# Patient Record
Sex: FEMALE | Race: WHITE
[De-identification: names, ages, dates, MRNs, and addresses within clinical notes are randomized per-mention and may not be internally consistent; named-entity substitution may affect disease eponyms.]

---

## 2016-10-27 VITALS — DIASTOLIC BLOOD PRESSURE: 70 MMHG | SYSTOLIC BLOOD PRESSURE: 116 MMHG

## 2017-01-20 ENCOUNTER — HOSPITAL ENCOUNTER (OUTPATIENT)
Dept: HOSPITAL 61 - CT | Age: 57
Discharge: HOME | End: 2017-01-20
Attending: SURGERY
Payer: COMMERCIAL

## 2017-01-20 DIAGNOSIS — R91.1: ICD-10-CM

## 2017-01-20 DIAGNOSIS — R10.84: Primary | ICD-10-CM

## 2017-01-20 PROCEDURE — 74174 CTA ABD&PLVS W/CONTRAST: CPT

## 2017-01-20 NOTE — RAD
Indication abdominal pain. Consider SMA syndrome.



CTA targeted to the abdominal aorta and its major branches was performed.

Images were reformatted in the coronal and sagittal planes. Volume rendered

images were also generated and reviewed.



An acute finding at either lung base is not seen. There is some scarring at

the left lung base. There is a small, approximately 2 mm, pulmonary nodule

peripherally in the left lower lobe, image 14 series 3. Follow-up imaging,

along the lines of the Fleischner criteria, should be considered. There is

some fatty infiltration of the liver. A focal mass lesion in the liver is not

seen. The spleen appears unremarkable. The pancreas appears normal. No adrenal

or renal anomalies are seen.



There are air fluid collections in the right lower quadrant that are not

clearly in bowel and are suggestive of small abscesses. One fluid collection,

immediately behind the rectus muscle, is almost certainly not in a bowel loop

and is compatible with a small abscess. (Findings secondary to ruptured

appendix accounting for the appearance is not excluded). There is,

additionally, significant dilatation and stool in distal loops of small bowel

(small bowel feces sign) compatible with incidental inspissated stool in the

small bowel. There is, additionally, some suggested thickening of small bowel

loops in the right lower quadrant suggesting superimposed enteritis.



Significant dilatation of the duodenal sweep, prior to traversing the aortic

SMA junction as might be expected with an SMA syndrome is not seen. On the

lateral images the angle between the abdominal aorta and the origination of

the superior mesenteric artery off the aorta is not particularly narrowed. The

distance between the aorta and SMA, at the level of the duodenum is borderline

normal. The overall appearance is not suggestive of SMA syndrome. There is no

significant narrowing at the origin of the celiac or SMA. Intestinal ischemia

is not suggested on this exam.



Dr. Glez was notified of the findings at 10:45 AM,



IMPRESSION: Probable abscesses in the right lower quadrant. The sequela of

ruptured appendicitis could account for the appearance. There is inspissated

bowel within distal small bowel loops which are moderately dilated. A

component of at least partial mechanical small bowel obstruction is thought

likely. Finally superimposed associated enteritis in distal small bowel loops

is suspect.. A follow-up examination, after opacification of all bowel loops

with GI contrast would be useful to confirm this initial impression.

                         Definite findings suggesting SMA syndrome are not

seen

                         Small pulmonary nodule in the left lower lobe.

Follow-up imaging, along the lines of the Fleischner criteria, should be

considered



















PQRS Compliance Statement:



One or more of the following individualized dose reduction techniques were

utilized for this examination:

1. Automated exposure control

2. Adjustment of the mA and/or kV according to patient size

3. Use of iterative reconstruction technique

## 2017-02-08 VITALS — SYSTOLIC BLOOD PRESSURE: 142 MMHG | DIASTOLIC BLOOD PRESSURE: 84 MMHG

## 2018-03-25 ENCOUNTER — HOSPITAL ENCOUNTER (INPATIENT)
Dept: HOSPITAL 61 - ER | Age: 58
LOS: 4 days | Discharge: HOME HEALTH | DRG: 481 | End: 2018-03-29
Attending: INTERNAL MEDICINE | Admitting: INTERNAL MEDICINE
Payer: COMMERCIAL

## 2018-03-25 DIAGNOSIS — D75.89: ICD-10-CM

## 2018-03-25 DIAGNOSIS — Z82.49: ICD-10-CM

## 2018-03-25 DIAGNOSIS — Z88.8: ICD-10-CM

## 2018-03-25 DIAGNOSIS — D53.9: ICD-10-CM

## 2018-03-25 DIAGNOSIS — I10: ICD-10-CM
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
## 2018-03-25 DIAGNOSIS — F17.210: ICD-10-CM

## 2018-03-25 DIAGNOSIS — S72.21XA: Primary | ICD-10-CM
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
## 2018-03-25 DIAGNOSIS — F10.10: ICD-10-CM
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
## 2018-03-25 LAB
ADD MAN DIFF?: NO
ALBUMIN SERPL-MCNC: 3.3 G/DL (ref 3.4–5)
ALP SERPL-CCNC: 145 U/L (ref 46–116)
ALT (SGPT): 30 U/L (ref 14–59)
AMPHETAMINE/METHAMPHETAMINE: (no result)
ANION GAP SERPL CALC-SCNC: 17 MMOL/L (ref 6–14)
AST SERPL-CCNC: 36 U/L (ref 15–37)
BACTERIA,URINE: (no result) /HPF
BARBITURATES: (no result)
BASO #: 0 X10^3/UL (ref 0–0.2)
BASO %: 0 % (ref 0–3)
BENZODIAZEPINES: (no result)
BILIRUB DIRECT SERPL-MCNC: 0.1 MG/DL (ref 0–0.2)
BILIRUBIN,URINE: NEGATIVE
BLOOD UREA NITROGEN: 8 MG/DL (ref 7–20)
CALCIUM: 8.5 MG/DL (ref 8.5–10.1)
CANNABINOIDS: (no result)
CHLORIDE: 99 MMOL/L (ref 98–107)
CK SERPL-CCNC: 687 U/L (ref 26–192)
CKMB INDEX: 0.3 % (ref 0–4)
CKMB MASS: 1.8 NG/ML (ref 0–3.6)
CLARITY,URINE: CLEAR
CO2 SERPL-SCNC: 21 MMOL/L (ref 21–32)
COCAINE: (no result)
COLOR,URINE: YELLOW
CREAT SERPL-MCNC: 0.6 MG/DL (ref 0.6–1)
EOS #: 0 X10^3/UL (ref 0–0.7)
EOS %: 0 % (ref 0–3)
ETHANOL, URINE: (no result)
ETHANOL: 133 MG/DL (ref 0–10)
GFR SERPLBLD BASED ON 1.73 SQ M-ARVRAT: 102.3 ML/MIN
GLUCOSE SERPL-MCNC: 105 MG/DL (ref 70–99)
GLUCOSE,URINE: NEGATIVE MG/DL
HCG SERPL-ACNC: 8.2 X10^3/UL (ref 4–11)
HEMATOCRIT: 33 % (ref 36–47)
HEMOGLOBIN: 11.4 G/DL (ref 12–15.5)
LYMPH #: 0.9 X10^3/UL (ref 1–4.8)
LYMPH %: 11 % (ref 24–48)
MAGNESIUM: 1.7 MG/DL (ref 1.8–2.4)
MEAN CORPUSCULAR HEMOGLOBIN: 37 PG (ref 25–35)
MEAN CORPUSCULAR HGB CONC: 35 G/DL (ref 31–37)
MEAN CORPUSCULAR VOLUME: 107 FL (ref 79–100)
METHADONE: (no result)
MONO #: 0.6 X10^3/UL (ref 0–1.1)
MONO %: 8 % (ref 0–9)
NEUT #: 6.6 X10^3UL (ref 1.8–7.7)
NEUT %: 81 % (ref 31–73)
NITRITE,URINE: NEGATIVE
NT-PRO BNP: 349 PG/ML (ref 0–124)
OPIATES: (no result)
PH,URINE: 5.5
PHENCYCLIDINE: (no result)
PLATELET COUNT: 173 X10^3/UL (ref 140–400)
POTASSIUM SERPL-SCNC: 4.3 MMOL/L (ref 3.5–5.1)
PROTEIN,URINE: NEGATIVE MG/DL
RBC,URINE: (no result) /HPF (ref 0–2)
RED BLOOD COUNT: 3.08 X10^6/UL (ref 3.5–5.4)
RED CELL DISTRIBUTION WIDTH: 14.9 % (ref 11.5–14.5)
SODIUM: 137 MMOL/L (ref 136–145)
SPECIFIC GRAVITY,URINE: 1.01
SQUAMOUS EPITHELIAL CELL,UR: (no result) /LPF
THYROID STIM HORMONE (TSH): 1.66 UIU/ML (ref 0.36–3.74)
TOTAL BILIRUBIN: 0.4 MG/DL (ref 0.2–1)
TOTAL PROTEIN: 6.9 G/DL (ref 6.4–8.2)
TROPONINI: < 0.017 NG/ML (ref 0–0.06)
UROBILINOGEN,URINE: 0.2 MG/DL
WBC,URINE: (no result) /HPF (ref 0–4)

## 2018-03-25 PROCEDURE — 36415 COLL VENOUS BLD VENIPUNCTURE: CPT

## 2018-03-25 PROCEDURE — 97535 SELF CARE MNGMENT TRAINING: CPT

## 2018-03-25 PROCEDURE — 84443 ASSAY THYROID STIM HORMONE: CPT

## 2018-03-25 PROCEDURE — 73552 X-RAY EXAM OF FEMUR 2/>: CPT

## 2018-03-25 PROCEDURE — 86850 RBC ANTIBODY SCREEN: CPT

## 2018-03-25 PROCEDURE — 84484 ASSAY OF TROPONIN QUANT: CPT

## 2018-03-25 PROCEDURE — 86920 COMPATIBILITY TEST SPIN: CPT

## 2018-03-25 PROCEDURE — 30233N1 TRANSFUSION OF NONAUTOLOGOUS RED BLOOD CELLS INTO PERIPHERAL VEIN, PERCUTANEOUS APPROACH: ICD-10-PCS

## 2018-03-25 PROCEDURE — 99285 EMERGENCY DEPT VISIT HI MDM: CPT

## 2018-03-25 PROCEDURE — 86900 BLOOD TYPING SEROLOGIC ABO: CPT

## 2018-03-25 PROCEDURE — 80053 COMPREHEN METABOLIC PANEL: CPT

## 2018-03-25 PROCEDURE — 71045 X-RAY EXAM CHEST 1 VIEW: CPT

## 2018-03-25 PROCEDURE — 73502 X-RAY EXAM HIP UNI 2-3 VIEWS: CPT

## 2018-03-25 PROCEDURE — 82553 CREATINE MB FRACTION: CPT

## 2018-03-25 PROCEDURE — 93005 ELECTROCARDIOGRAM TRACING: CPT

## 2018-03-25 PROCEDURE — 0QS606Z REPOSITION RIGHT UPPER FEMUR WITH INTRAMEDULLARY INTERNAL FIXATION DEVICE, OPEN APPROACH: ICD-10-PCS

## 2018-03-25 PROCEDURE — 97166 OT EVAL MOD COMPLEX 45 MIN: CPT

## 2018-03-25 PROCEDURE — 97116 GAIT TRAINING THERAPY: CPT

## 2018-03-25 PROCEDURE — 29505 APPLICATION LONG LEG SPLINT: CPT

## 2018-03-25 PROCEDURE — 83880 ASSAY OF NATRIURETIC PEPTIDE: CPT

## 2018-03-25 PROCEDURE — 80307 DRUG TEST PRSMV CHEM ANLYZR: CPT

## 2018-03-25 PROCEDURE — 80076 HEPATIC FUNCTION PANEL: CPT

## 2018-03-25 PROCEDURE — 81001 URINALYSIS AUTO W/SCOPE: CPT

## 2018-03-25 PROCEDURE — 51702 INSERT TEMP BLADDER CATH: CPT

## 2018-03-25 PROCEDURE — 83735 ASSAY OF MAGNESIUM: CPT

## 2018-03-25 PROCEDURE — 97530 THERAPEUTIC ACTIVITIES: CPT

## 2018-03-25 PROCEDURE — 85014 HEMATOCRIT: CPT

## 2018-03-25 PROCEDURE — 86901 BLOOD TYPING SEROLOGIC RH(D): CPT

## 2018-03-25 PROCEDURE — 85018 HEMOGLOBIN: CPT

## 2018-03-25 PROCEDURE — 96374 THER/PROPH/DIAG INJ IV PUSH: CPT

## 2018-03-25 PROCEDURE — 97161 PT EVAL LOW COMPLEX 20 MIN: CPT

## 2018-03-25 PROCEDURE — 85025 COMPLETE CBC W/AUTO DIFF WBC: CPT

## 2018-03-25 PROCEDURE — 76000 FLUOROSCOPY <1 HR PHYS/QHP: CPT

## 2018-03-25 PROCEDURE — 90686 IIV4 VACC NO PRSV 0.5 ML IM: CPT

## 2018-03-25 PROCEDURE — 97110 THERAPEUTIC EXERCISES: CPT

## 2018-03-25 PROCEDURE — 80048 BASIC METABOLIC PNL TOTAL CA: CPT

## 2018-03-25 PROCEDURE — 82306 VITAMIN D 25 HYDROXY: CPT

## 2018-03-25 RX ADMIN — FENTANYL CITRATE 1 MCG: 50 INJECTION INTRAMUSCULAR; INTRAVENOUS at 09:37

## 2018-03-25 RX ADMIN — SODIUM CHLORIDE, SODIUM LACTATE, POTASSIUM CHLORIDE, AND CALCIUM CHLORIDE 1 MLS/HR: .6; .31; .03; .02 INJECTION, SOLUTION INTRAVENOUS at 15:28

## 2018-03-25 RX ADMIN — FENTANYL CITRATE 1 MCG: 50 INJECTION INTRAMUSCULAR; INTRAVENOUS at 15:21

## 2018-03-25 RX ADMIN — DEXTROSE AND SODIUM CHLORIDE 1 MLS/HR: 5; .45 INJECTION, SOLUTION INTRAVENOUS at 11:26

## 2018-03-25 RX ADMIN — SENNOSIDES AND DOCUSATE SODIUM 1 TAB: 8.6; 5 TABLET ORAL at 18:47

## 2018-03-25 RX ADMIN — CEFAZOLIN 1 GM: 330 INJECTION, POWDER, FOR SOLUTION INTRAMUSCULAR; INTRAVENOUS at 18:52

## 2018-03-25 RX ADMIN — FOLIC ACID 1 MLS/HR: 5 INJECTION, SOLUTION INTRAMUSCULAR; INTRAVENOUS; SUBCUTANEOUS at 18:50

## 2018-03-25 RX ADMIN — CEFAZOLIN 1 MLS/HR: 1 INJECTION, POWDER, FOR SOLUTION INTRAMUSCULAR; INTRAVENOUS at 15:30

## 2018-03-25 RX ADMIN — SODIUM CHLORIDE, SODIUM LACTATE, POTASSIUM CHLORIDE, AND CALCIUM CHLORIDE 1 MLS/HR: .6; .31; .03; .02 INJECTION, SOLUTION INTRAVENOUS at 13:45

## 2018-03-25 RX ADMIN — MAGNESIUM SULFATE IN WATER 1 MLS/HR: 40 INJECTION, SOLUTION INTRAVENOUS at 18:51

## 2018-03-25 RX ADMIN — FOLIC ACID-PYRIDOXINE-CYANOCOBALAMIN TAB 2.5-25-2 MG 1 TAB: 2.5-25-2 TAB at 18:47

## 2018-03-25 RX ADMIN — APIXABAN 1 MG: 2.5 TABLET, FILM COATED ORAL at 20:25

## 2018-03-26 LAB
ADD MAN DIFF?: NO
ALBUMIN SERPL-MCNC: 2.5 G/DL (ref 3.4–5)
ALBUMIN/GLOB SERPL: 0.8 {RATIO} (ref 1–1.7)
ALP SERPL-CCNC: 104 U/L (ref 46–116)
ALT (SGPT): 20 U/L (ref 14–59)
ANION GAP SERPL CALC-SCNC: 9 MMOL/L (ref 6–14)
AST SERPL-CCNC: 23 U/L (ref 15–37)
BASO #: 0 X10^3/UL (ref 0–0.2)
BASO %: 0 % (ref 0–3)
BLOOD UREA NITROGEN: 9 MG/DL (ref 7–20)
BUN/CREAT SERPL: 15 (ref 6–20)
CALCIUM: 8.2 MG/DL (ref 8.5–10.1)
CHLORIDE: 102 MMOL/L (ref 98–107)
CO2 SERPL-SCNC: 27 MMOL/L (ref 21–32)
CREAT SERPL-MCNC: 0.6 MG/DL (ref 0.6–1)
EOS #: 0 X10^3/UL (ref 0–0.7)
EOS %: 0 % (ref 0–3)
GFR SERPLBLD BASED ON 1.73 SQ M-ARVRAT: 102.3 ML/MIN
GLOBULIN SER-MCNC: 3.2 G/DL (ref 2.2–3.8)
GLUCOSE SERPL-MCNC: 112 MG/DL (ref 70–99)
HCG SERPL-ACNC: 5.2 X10^3/UL (ref 4–11)
HEMATOCRIT: 21.9 % (ref 36–47)
HEMOGLOBIN: 7.7 G/DL (ref 12–15.5)
LYMPH #: 1.6 X10^3/UL (ref 1–4.8)
LYMPH %: 31 % (ref 24–48)
MEAN CORPUSCULAR HEMOGLOBIN: 38 PG (ref 25–35)
MEAN CORPUSCULAR HGB CONC: 35 G/DL (ref 31–37)
MEAN CORPUSCULAR VOLUME: 107 FL (ref 79–100)
MONO #: 0.7 X10^3/UL (ref 0–1.1)
MONO %: 14 % (ref 0–9)
NEUT #: 2.9 X10^3UL (ref 1.8–7.7)
NEUT %: 55 % (ref 31–73)
PLATELET COUNT: 116 X10^3/UL (ref 140–400)
POTASSIUM SERPL-SCNC: 4.5 MMOL/L (ref 3.5–5.1)
RED BLOOD COUNT: 2.04 X10^6/UL (ref 3.5–5.4)
RED CELL DISTRIBUTION WIDTH: 14.7 % (ref 11.5–14.5)
SODIUM: 138 MMOL/L (ref 136–145)
TOTAL BILIRUBIN: 0.4 MG/DL (ref 0.2–1)
TOTAL PROTEIN: 5.7 G/DL (ref 6.4–8.2)

## 2018-03-26 RX ADMIN — SODIUM CHLORIDE, SODIUM LACTATE, POTASSIUM CHLORIDE, AND CALCIUM CHLORIDE 1 MLS/HR: .6; .31; .03; .02 INJECTION, SOLUTION INTRAVENOUS at 02:20

## 2018-03-26 RX ADMIN — CEFAZOLIN 1 GM: 330 INJECTION, POWDER, FOR SOLUTION INTRAMUSCULAR; INTRAVENOUS at 01:00

## 2018-03-26 RX ADMIN — MULTIPLE VITAMINS W/ MINERALS TAB 1 TAB: TAB at 08:49

## 2018-03-26 RX ADMIN — INFLUENZA VIRUS VACCINE 1 ML: 15; 15; 15; 15 SUSPENSION INTRAMUSCULAR at 12:13

## 2018-03-26 RX ADMIN — LISINOPRIL 1 MG: 5 TABLET ORAL at 08:48

## 2018-03-26 RX ADMIN — Medication 1 EACH: at 11:30

## 2018-03-26 RX ADMIN — APIXABAN 1 MG: 2.5 TABLET, FILM COATED ORAL at 19:51

## 2018-03-26 RX ADMIN — APIXABAN 1 MG: 2.5 TABLET, FILM COATED ORAL at 09:00

## 2018-03-26 RX ADMIN — ASPIRIN 1 MG: 81 TABLET, COATED ORAL at 08:49

## 2018-03-26 RX ADMIN — CEFAZOLIN 1 GM: 330 INJECTION, POWDER, FOR SOLUTION INTRAMUSCULAR; INTRAVENOUS at 02:39

## 2018-03-26 RX ADMIN — FOLIC ACID-PYRIDOXINE-CYANOCOBALAMIN TAB 2.5-25-2 MG 1 TAB: 2.5-25-2 TAB at 08:48

## 2018-03-26 RX ADMIN — SENNOSIDES AND DOCUSATE SODIUM 1 TAB: 8.6; 5 TABLET ORAL at 08:49

## 2018-03-26 RX ADMIN — CEFAZOLIN 1 GM: 330 INJECTION, POWDER, FOR SOLUTION INTRAMUSCULAR; INTRAVENOUS at 08:49

## 2018-03-26 RX ADMIN — CHOLECALCIFEROL CAP 125 MCG (5000 UNIT) 1 UNIT: 125 CAP at 08:48

## 2018-03-27 LAB
ADD MAN DIFF?: NO
ALBUMIN SERPL-MCNC: 2.3 G/DL (ref 3.4–5)
ALBUMIN/GLOB SERPL: 0.7 {RATIO} (ref 1–1.7)
ALP SERPL-CCNC: 104 U/L (ref 46–116)
ALT (SGPT): 17 U/L (ref 14–59)
ANION GAP SERPL CALC-SCNC: 5 MMOL/L (ref 6–14)
AST SERPL-CCNC: 25 U/L (ref 15–37)
BASO #: 0 X10^3/UL (ref 0–0.2)
BASO %: 0 % (ref 0–3)
BLOOD UREA NITROGEN: 9 MG/DL (ref 7–20)
BUN/CREAT SERPL: 15 (ref 6–20)
CALCIUM: 8.3 MG/DL (ref 8.5–10.1)
CHLORIDE: 104 MMOL/L (ref 98–107)
CO2 SERPL-SCNC: 31 MMOL/L (ref 21–32)
CREAT SERPL-MCNC: 0.6 MG/DL (ref 0.6–1)
EOS #: 0 X10^3/UL (ref 0–0.7)
EOS %: 0 % (ref 0–3)
GFR SERPLBLD BASED ON 1.73 SQ M-ARVRAT: 102.3 ML/MIN
GLOBULIN SER-MCNC: 3.2 G/DL (ref 2.2–3.8)
GLUCOSE SERPL-MCNC: 94 MG/DL (ref 70–99)
HCG SERPL-ACNC: 4.8 X10^3/UL (ref 4–11)
HEMATOCRIT: 19.8 % (ref 36–47)
HEMATOCRIT: 29.4 % (ref 36–47)
HEMOGLOBIN: 10 G/DL (ref 12–15.5)
HEMOGLOBIN: 6.9 G/DL (ref 12–15.5)
IMMEDIATE SPIN CROSSMATCH: 1 2
LYMPH #: 2 X10^3/UL (ref 1–4.8)
LYMPH %: 42 % (ref 24–48)
MEAN CORPUSCULAR HEMOGLOBIN: 38 PG (ref 25–35)
MEAN CORPUSCULAR HGB CONC: 34 G/DL (ref 31–37)
MEAN CORPUSCULAR HGB CONC: 35 G/DL (ref 31–37)
MEAN CORPUSCULAR VOLUME: 109 FL (ref 79–100)
MONO #: 0.5 X10^3/UL (ref 0–1.1)
MONO %: 10 % (ref 0–9)
NEUT #: 2.3 X10^3UL (ref 1.8–7.7)
NEUT %: 48 % (ref 31–73)
PLATELET COUNT: 124 X10^3/UL (ref 140–400)
POTASSIUM SERPL-SCNC: 3.9 MMOL/L (ref 3.5–5.1)
RED BLOOD COUNT: 1.82 X10^6/UL (ref 3.5–5.4)
RED CELL DISTRIBUTION WIDTH: 14.7 % (ref 11.5–14.5)
SODIUM: 140 MMOL/L (ref 136–145)
TOTAL BILIRUBIN: 0.4 MG/DL (ref 0.2–1)
TOTAL PROTEIN: 5.5 G/DL (ref 6.4–8.2)

## 2018-03-27 RX ADMIN — FOLIC ACID-PYRIDOXINE-CYANOCOBALAMIN TAB 2.5-25-2 MG 1 TAB: 2.5-25-2 TAB at 08:25

## 2018-03-27 RX ADMIN — CHOLECALCIFEROL CAP 125 MCG (5000 UNIT) 1 UNIT: 125 CAP at 08:25

## 2018-03-27 RX ADMIN — APIXABAN 1 MG: 2.5 TABLET, FILM COATED ORAL at 08:26

## 2018-03-27 RX ADMIN — FOLIC ACID 1 MG: 1 TABLET ORAL at 12:18

## 2018-03-27 RX ADMIN — MULTIPLE VITAMINS W/ MINERALS TAB 1 TAB: TAB at 08:26

## 2018-03-27 RX ADMIN — LISINOPRIL 1 MG: 5 TABLET ORAL at 08:27

## 2018-03-27 RX ADMIN — ASPIRIN 1 MG: 81 TABLET, COATED ORAL at 08:26

## 2018-03-27 RX ADMIN — Medication 1 MG: at 12:19

## 2018-03-27 RX ADMIN — APIXABAN 1 MG: 2.5 TABLET, FILM COATED ORAL at 21:00

## 2018-03-27 RX ADMIN — SENNOSIDES AND DOCUSATE SODIUM 1 TAB: 8.6; 5 TABLET ORAL at 08:25

## 2018-03-28 LAB
ADD MAN DIFF?: NO
ANION GAP SERPL CALC-SCNC: 11 MMOL/L (ref 6–14)
BASO #: 0 X10^3/UL (ref 0–0.2)
BASO %: 1 % (ref 0–3)
BLOOD UREA NITROGEN: 6 MG/DL (ref 7–20)
CALCIUM: 8.8 MG/DL (ref 8.5–10.1)
CHLORIDE: 100 MMOL/L (ref 98–107)
CO2 SERPL-SCNC: 28 MMOL/L (ref 21–32)
CREAT SERPL-MCNC: 0.6 MG/DL (ref 0.6–1)
EOS #: 0 X10^3/UL (ref 0–0.7)
EOS %: 1 % (ref 0–3)
GFR SERPLBLD BASED ON 1.73 SQ M-ARVRAT: 102.3 ML/MIN
GLUCOSE SERPL-MCNC: 106 MG/DL (ref 70–99)
HCG SERPL-ACNC: 6.2 X10^3/UL (ref 4–11)
HEMATOCRIT: 27.8 % (ref 36–47)
HEMOGLOBIN: 9.5 G/DL (ref 12–15.5)
LYMPH #: 1.7 X10^3/UL (ref 1–4.8)
LYMPH %: 28 % (ref 24–48)
MEAN CORPUSCULAR HEMOGLOBIN: 34 PG (ref 25–35)
MEAN CORPUSCULAR HGB CONC: 34 G/DL (ref 31–37)
MEAN CORPUSCULAR VOLUME: 98 FL (ref 79–100)
MONO #: 0.5 X10^3/UL (ref 0–1.1)
MONO %: 9 % (ref 0–9)
NEUT #: 3.8 X10^3UL (ref 1.8–7.7)
NEUT %: 62 % (ref 31–73)
PLATELET COUNT: 186 X10^3/UL (ref 140–400)
POTASSIUM SERPL-SCNC: 3.3 MMOL/L (ref 3.5–5.1)
RED BLOOD COUNT: 2.84 X10^6/UL (ref 3.5–5.4)
RED CELL DISTRIBUTION WIDTH: 25.1 % (ref 11.5–14.5)
SODIUM: 139 MMOL/L (ref 136–145)

## 2018-03-28 RX ADMIN — FOLIC ACID 1 MG: 1 TABLET ORAL at 08:42

## 2018-03-28 RX ADMIN — SENNOSIDES AND DOCUSATE SODIUM 1 TAB: 8.6; 5 TABLET ORAL at 08:42

## 2018-03-28 RX ADMIN — Medication 1 MG: at 08:42

## 2018-03-28 RX ADMIN — APIXABAN 1 MG: 2.5 TABLET, FILM COATED ORAL at 20:38

## 2018-03-28 RX ADMIN — APIXABAN 1 MG: 2.5 TABLET, FILM COATED ORAL at 09:00

## 2018-03-28 RX ADMIN — CHOLECALCIFEROL CAP 125 MCG (5000 UNIT) 1 UNIT: 125 CAP at 09:01

## 2018-03-28 RX ADMIN — ASPIRIN 1 MG: 81 TABLET, COATED ORAL at 08:43

## 2018-03-28 RX ADMIN — FOLIC ACID-PYRIDOXINE-CYANOCOBALAMIN TAB 2.5-25-2 MG 1 TAB: 2.5-25-2 TAB at 08:56

## 2018-03-28 RX ADMIN — MULTIPLE VITAMINS W/ MINERALS TAB 1 TAB: TAB at 08:42

## 2018-03-28 RX ADMIN — LISINOPRIL 1 MG: 5 TABLET ORAL at 08:43

## 2018-03-29 LAB
ADD MAN DIFF?: NO
ANION GAP SERPL CALC-SCNC: 8 MMOL/L (ref 6–14)
ANISOCYTOSIS: PRESENT
BASO #: 0 X10^3/UL (ref 0–0.2)
BASO %: 0 % (ref 0–3)
BLOOD UREA NITROGEN: 7 MG/DL (ref 7–20)
CALCIUM: 8.9 MG/DL (ref 8.5–10.1)
CHLORIDE: 102 MMOL/L (ref 98–107)
CO2 SERPL-SCNC: 30 MMOL/L (ref 21–32)
CREAT SERPL-MCNC: 0.5 MG/DL (ref 0.6–1)
EOS #: 0.1 X10^3/UL (ref 0–0.7)
EOS %: 2 % (ref 0–3)
GFR SERPLBLD BASED ON 1.73 SQ M-ARVRAT: 126.3 ML/MIN
GLUCOSE SERPL-MCNC: 93 MG/DL (ref 70–99)
HCG SERPL-ACNC: 5.9 X10^3/UL (ref 4–11)
HEMATOCRIT: 27.8 % (ref 36–47)
HEMOGLOBIN: 9.4 G/DL (ref 12–15.5)
LYMPH #: 1.9 X10^3/UL (ref 1–4.8)
LYMPH %: 32 % (ref 24–48)
MACROCYTOSIS: PRESENT
MEAN CORPUSCULAR HEMOGLOBIN: 34 PG (ref 25–35)
MEAN CORPUSCULAR HGB CONC: 34 G/DL (ref 31–37)
MEAN CORPUSCULAR VOLUME: 99 FL (ref 79–100)
MONO #: 0.7 X10^3/UL (ref 0–1.1)
MONO %: 11 % (ref 0–9)
NEUT #: 3.3 X10^3UL (ref 1.8–7.7)
NEUT %: 55 % (ref 31–73)
PLATELET COUNT: 214 X10^3/UL (ref 140–400)
PLT ESTIMATE: ADEQUATE
POIKILOCYTOSIS: PRESENT
POLYCHROMASIA: PRESENT
POTASSIUM SERPL-SCNC: 3.7 MMOL/L (ref 3.5–5.1)
RED BLOOD COUNT: 2.81 X10^6/UL (ref 3.5–5.4)
RED CELL DISTRIBUTION WIDTH: 24.6 % (ref 11.5–14.5)
SODIUM: 140 MMOL/L (ref 136–145)

## 2018-03-29 RX ADMIN — MULTIPLE VITAMINS W/ MINERALS TAB 1 TAB: TAB at 08:44

## 2018-03-29 RX ADMIN — Medication 1 MG: at 08:44

## 2018-03-29 RX ADMIN — CHOLECALCIFEROL CAP 125 MCG (5000 UNIT) 1 UNIT: 125 CAP at 08:43

## 2018-03-29 RX ADMIN — LISINOPRIL 1 MG: 5 TABLET ORAL at 08:44

## 2018-03-29 RX ADMIN — APIXABAN 1 MG: 2.5 TABLET, FILM COATED ORAL at 09:00

## 2018-03-29 RX ADMIN — ASPIRIN 1 MG: 81 TABLET, COATED ORAL at 08:44

## 2018-03-29 RX ADMIN — FOLIC ACID 1 MG: 1 TABLET ORAL at 08:44

## 2018-03-29 RX ADMIN — SENNOSIDES AND DOCUSATE SODIUM 1 TAB: 8.6; 5 TABLET ORAL at 08:44

## 2018-03-29 RX ADMIN — FOLIC ACID-PYRIDOXINE-CYANOCOBALAMIN TAB 2.5-25-2 MG 1 TAB: 2.5-25-2 TAB at 08:43

## 2018-03-29 RX ADMIN — Medication 1 EACH: at 14:00

## 2018-11-15 ENCOUNTER — HOSPITAL ENCOUNTER (OUTPATIENT)
Dept: HOSPITAL 63 - LAB | Age: 58
Discharge: HOME | End: 2018-11-15
Attending: INTERNAL MEDICINE
Payer: COMMERCIAL

## 2018-11-15 DIAGNOSIS — E78.5: Primary | ICD-10-CM

## 2018-11-15 LAB
ALBUMIN SERPL-MCNC: 3.7 G/DL (ref 3.4–5)
ALBUMIN/GLOB SERPL: 0.9 {RATIO} (ref 1–1.7)
ALP SERPL-CCNC: 203 U/L (ref 46–116)
ALT SERPL-CCNC: 32 U/L (ref 14–59)
ANION GAP SERPL CALC-SCNC: 10 MMOL/L (ref 6–14)
AST SERPL-CCNC: 36 U/L (ref 15–37)
BILIRUB SERPL-MCNC: 0.5 MG/DL (ref 0.2–1)
BUN/CREAT SERPL: 18 (ref 6–20)
CA-I SERPL ISE-MCNC: 11 MG/DL (ref 7–20)
CALCIUM SERPL-MCNC: 8.9 MG/DL (ref 8.5–10.1)
CHLORIDE SERPL-SCNC: 100 MMOL/L (ref 98–107)
CHOLEST/HDLC SERPL: 1 {RATIO}
CO2 SERPL-SCNC: 28 MMOL/L (ref 21–32)
CREAT SERPL-MCNC: 0.6 MG/DL (ref 0.6–1)
GFR SERPLBLD BASED ON 1.73 SQ M-ARVRAT: 102 ML/MIN
GLOBULIN SER-MCNC: 4.2 G/DL (ref 2.2–3.8)
GLUCOSE SERPL-MCNC: 84 MG/DL (ref 70–99)
HDLC SERPL-MCNC: 132 MG/DL (ref 40–60)
LDLC: 68 MG/DL (ref 0–100)
POTASSIUM SERPL-SCNC: 4.1 MMOL/L (ref 3.5–5.1)
PROT SERPL-MCNC: 7.9 G/DL (ref 6.4–8.2)
SODIUM SERPL-SCNC: 138 MMOL/L (ref 136–145)
TRIGL SERPL-MCNC: 65 MG/DL (ref 0–150)
VLDLC: 13 MG/DL (ref 0–40)

## 2018-11-15 PROCEDURE — 80061 LIPID PANEL: CPT

## 2018-11-15 PROCEDURE — 80053 COMPREHEN METABOLIC PANEL: CPT

## 2018-11-15 PROCEDURE — 36415 COLL VENOUS BLD VENIPUNCTURE: CPT

## 2020-03-04 ENCOUNTER — HOSPITAL ENCOUNTER (EMERGENCY)
Dept: HOSPITAL 63 - ER | Age: 60
Discharge: HOME | End: 2020-03-04
Payer: COMMERCIAL

## 2020-03-04 VITALS
DIASTOLIC BLOOD PRESSURE: 64 MMHG | SYSTOLIC BLOOD PRESSURE: 159 MMHG | DIASTOLIC BLOOD PRESSURE: 64 MMHG | SYSTOLIC BLOOD PRESSURE: 159 MMHG

## 2020-03-04 VITALS — BODY MASS INDEX: 17.16 KG/M2 | HEIGHT: 64 IN | WEIGHT: 100.53 LBS

## 2020-03-04 DIAGNOSIS — E78.00: ICD-10-CM

## 2020-03-04 DIAGNOSIS — R04.0: Primary | ICD-10-CM

## 2020-03-04 DIAGNOSIS — I25.10: ICD-10-CM

## 2020-03-04 DIAGNOSIS — F17.210: ICD-10-CM

## 2020-03-04 DIAGNOSIS — H60.91: ICD-10-CM
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
## 2020-03-04 DIAGNOSIS — I25.2: ICD-10-CM

## 2020-03-04 DIAGNOSIS — F10.20: ICD-10-CM

## 2020-03-04 DIAGNOSIS — I10: ICD-10-CM

## 2020-03-04 LAB
ANION GAP SERPL CALC-SCNC: 10 MMOL/L (ref 6–14)
ANISOCYTOSIS BLD QL SMEAR: SLIGHT
BASOPHILS # BLD AUTO: 0 X10^3/UL (ref 0–0.2)
BASOPHILS NFR BLD: 1 % (ref 0–3)
CA-I SERPL ISE-MCNC: 17 MG/DL (ref 7–20)
CALCIUM SERPL-MCNC: 8.8 MG/DL (ref 8.5–10.1)
CHLORIDE SERPL-SCNC: 100 MMOL/L (ref 98–107)
CO2 SERPL-SCNC: 28 MMOL/L (ref 21–32)
CREAT SERPL-MCNC: 0.7 MG/DL (ref 0.6–1)
EOSINOPHIL NFR BLD: 0.1 X10^3/UL (ref 0–0.7)
EOSINOPHIL NFR BLD: 1 % (ref 0–3)
ERYTHROCYTE [DISTWIDTH] IN BLOOD BY AUTOMATED COUNT: 14.5 % (ref 11.5–14.5)
GFR SERPLBLD BASED ON 1.73 SQ M-ARVRAT: 85.1 ML/MIN
GLUCOSE SERPL-MCNC: 103 MG/DL (ref 70–99)
HCT VFR BLD CALC: 34.3 % (ref 36–47)
HGB BLD-MCNC: 11.5 G/DL (ref 12–15.5)
LYMPHOCYTES # BLD: 2.8 X10^3/UL (ref 1–4.8)
LYMPHOCYTES NFR BLD AUTO: 28 % (ref 24–48)
MACROCYTES BLD QL SMEAR: SLIGHT
MCH RBC QN AUTO: 38 PG (ref 25–35)
MCHC RBC AUTO-ENTMCNC: 34 G/DL (ref 31–37)
MCV RBC AUTO: 114 FL (ref 79–100)
MONO #: 0.5 X10^3/UL (ref 0–1.1)
MONOCYTES NFR BLD: 6 % (ref 0–9)
NEUT #: 6.3 X10^3UL (ref 1.8–7.7)
NEUTROPHILS NFR BLD AUTO: 65 % (ref 31–73)
PLATELET # BLD AUTO: 254 X10^3/UL (ref 140–400)
PLATELET # BLD EST: ADEQUATE 10*3/UL
POTASSIUM SERPL-SCNC: 4 MMOL/L (ref 3.5–5.1)
RBC # BLD AUTO: 3.01 X10^6/UL (ref 3.5–5.4)
SODIUM SERPL-SCNC: 138 MMOL/L (ref 136–145)
WBC # BLD AUTO: 9.8 X10^3/UL (ref 4–11)

## 2020-03-04 PROCEDURE — 85610 PROTHROMBIN TIME: CPT

## 2020-03-04 PROCEDURE — 80048 BASIC METABOLIC PNL TOTAL CA: CPT

## 2020-03-04 PROCEDURE — 99283 EMERGENCY DEPT VISIT LOW MDM: CPT

## 2020-03-04 PROCEDURE — 36415 COLL VENOUS BLD VENIPUNCTURE: CPT

## 2020-03-04 PROCEDURE — 85730 THROMBOPLASTIN TIME PARTIAL: CPT

## 2020-03-04 PROCEDURE — 85025 COMPLETE CBC W/AUTO DIFF WBC: CPT

## 2020-03-04 NOTE — PHYS DOC
Past History


Past Medical History:  CAD, High Cholesterol, Hypertension, MI, Other


Past Surgical History:  Other


Additional Past Surgical Histo:  ABD SURGERY


Smoking:  Cigarettes


Alcohol Use:  Heavy


Drug Use:  None





Adult General


Chief Complaint


Chief Complaint:  NOSEBLEED





HPI


HPI


Patient is a 59-year-old female presents to the emergency department for 

evaluation. She states that she has had nose bleeding on and off throughout the 

week, primarily from her left nostril. She states she had a large bleed this 

morning which seems to have slowed down at this time. She also states that her 

right ear started bleeding today. She has had upper respiratory symptoms and is 

on the last day of an amoxicillin course by her PCP. She denies any pain. She 

denies any significant shortness of breath. She is a chronic smoker. There are 

no alleviating or exacerbating factors to her symptoms. She has not otherwise 

had any problems with easy bleeding.





Review of Systems


Review of Systems





Constitutional: Denies fever or chills []


Eyes: Denies change in visual acuity, redness, or eye pain []


HENT: Denies nasal congestion or sore throat, denies otalgia []


Respiratory: Denies  shortness of breath []


Cardiovascular: No additional information not addressed in HPI []


GI: Denies abdominal pain, nausea, vomiting, bloody stools or diarrhea []


: Denies dysuria or hematuria []


Musculoskeletal: Denies back pain or joint pain []


Integument: Denies rash or skin lesions []


Neurologic: Denies headache, focal weakness or sensory changes []





Allergies


Allergies





Allergies








Coded Allergies Type Severity Reaction Last Updated Verified


 


  naproxen Adverse Reaction Intermediate  3/4/20 Yes











Physical Exam


Physical Exam


PHYSICAL EXAM:





CONSTITUTIONAL: Well developed, well nourished


HEAD: normocephalic, atraumatic


EENT: PERRL, EOMI. Conjunctivae normal color, sclerae non-icteric; moist mucous 

membranes. There is a large amount of blood in the external auditory canal on 

the right, obscuring visualization of the tympanic membrane. There is a large 

clot in the posterior nasal passages on the left, which, when the patient has 

coughed that out, reveals somewhat of an excoriated nasal mucosa without any 

active bleeding at this time. There is no bleeding in the posterior pharynx.


NECK: Supple, non-tender; no meningismus.


LUNGS: mild scattered wheezes, breathing even and unlabored. Normal air 

movement.


HEART: Regular rate and rhythm, no murmur


CHEST: No deformity; non-tender


ABDOMEN: The abdomen is soft, and non-tender, no masses or bruits.


EXTREM: Normal ROM; no deformity, no calf tenderness. Normal pulses palpable in 

all extremities. There is no pedal edema.


SKIN: No rash; no diaphoresis


NEURO: Alert; normal speech and cognition; CN's grossly intact; strength grossly

 intact without focal deficit.


BACK: No CVA TTP.





Current Patient Data


Vital Signs





                                   Vital Signs








  Date Time  Temp Pulse Resp B/P (MAP) Pulse Ox O2 Delivery O2 Flow Rate FiO2


 


3/4/20 14:18 98.3 85 18 159/64 (95) 98 Room Air  








Lab Results





Laboratory Tests








Test


 3/4/20


14:32


 


White Blood Count 9.8 x10^3/uL 


 


Red Blood Count 3.01 x10^6/uL 


 


Hemoglobin 11.5 g/dL 


 


Hematocrit 34.3 % 


 


Mean Corpuscular Volume 114 fL 


 


Mean Corpuscular Hemoglobin 38 pg 


 


Mean Corpuscular Hemoglobin


Concent 34 g/dL 





 


Red Cell Distribution Width 14.5 % 


 


Platelet Count 254 x10^3/uL 


 


Neutrophils (%) (Auto) 65 % 


 


Lymphocytes (%) (Auto) 28 % 


 


Monocytes (%) (Auto) 6 % 


 


Eosinophils (%) (Auto) 1 % 


 


Basophils (%) (Auto) 1 % 


 


Neutrophils # (Auto) 6.3 x10^3uL 


 


Lymphocytes # (Auto) 2.8 x10^3/uL 


 


Monocytes # (Auto) 0.5 x10^3/uL 


 


Eosinophils # (Auto) 0.1 x10^3/uL 


 


Basophils # (Auto) 0.0 x10^3/uL 


 


Platelet Estimate Pending 


 


Prothrombin Time 9.9 SEC 


 


Prothromb Time International


Ratio 1.0 





 


Activated Partial


Thromboplast Time 24 SEC 





 


Sodium Level 138 mmol/L 


 


Potassium Level 4.0 mmol/L 


 


Chloride Level 100 mmol/L 


 


Carbon Dioxide Level 28 mmol/L 


 


Anion Gap 10 


 


Blood Urea Nitrogen 17 mg/dL 


 


Creatinine 0.7 mg/dL 


 


Estimated GFR


(Cockcroft-Gault) 85.1 





 


Glucose Level 103 mg/dL 


 


Calcium Level 8.8 mg/dL 











EKG


EKG


[]





Radiology/Procedures


Radiology/Procedures


[]





Course & Med Decision Making


Course & Med Decision Making


Pertinent Labs studies reviewed. (See chart for details)





[]3:45 PM:Patient remains stable. I discussed test results, the need for close 

follow-up, and return precautions. The patient's hemoglobin is not significantly

 different than her prior values. I discussed importance of further close 

outpatient follow-up. After irrigating the patient's ear canal, there does 

appears to be some inflammation in the right external auditory canal.





Dragon Disclaimer


Dragon Disclaimer


This electronic medical record was generated, in whole or in part, using a voice

 recognition dictation system.





Departure


Departure:


Impression:  


   Primary Impression:  


   Epistaxis


   Additional Impression:  


   Otitis externa


Disposition:  01 HOME, SELF-CARE


Condition:  STABLE


Referrals:  


REJI LOWE (PCP)


Patient Instructions:  Nosebleed, Otitis Externa


Scripts


Ciprofloxacin Hcl/Dexameth (CIPRODEX OTIC SUSPENSION) 7.5 Ml Drops.susp


4 DROP RIGHT EAR QID for -, #7.5 ML


   Prov: FRITZ HASSAN MD         3/4/20





Problem Qualifiers











FRITZ HASSAN MD            Mar 4, 2020 14:39

## 2021-09-06 ENCOUNTER — HOSPITAL ENCOUNTER (EMERGENCY)
Dept: HOSPITAL 61 - ER | Age: 61
Discharge: HOME | End: 2021-09-06
Payer: COMMERCIAL

## 2021-09-06 VITALS — BODY MASS INDEX: 20.31 KG/M2 | WEIGHT: 114.64 LBS | HEIGHT: 63 IN

## 2021-09-06 VITALS — SYSTOLIC BLOOD PRESSURE: 152 MMHG | DIASTOLIC BLOOD PRESSURE: 84 MMHG

## 2021-09-06 DIAGNOSIS — G89.11: ICD-10-CM
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
## 2021-09-06 DIAGNOSIS — F17.200: ICD-10-CM

## 2021-09-06 DIAGNOSIS — M54.5: Primary | ICD-10-CM

## 2021-09-06 DIAGNOSIS — Z88.5: ICD-10-CM

## 2021-09-06 DIAGNOSIS — I10: ICD-10-CM
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
## 2021-09-06 PROCEDURE — 99284 EMERGENCY DEPT VISIT MOD MDM: CPT

## 2021-09-06 PROCEDURE — 72100 X-RAY EXAM L-S SPINE 2/3 VWS: CPT

## 2021-09-06 PROCEDURE — 72170 X-RAY EXAM OF PELVIS: CPT

## 2021-09-06 NOTE — RAD
Exam: Pelvis 1 view



INDICATION: Fall, lower back pain



TECHNIQUE: Frontal view of the pelvis



Comparisons: None



FINDINGS:

Diffuse osteopenia. Partial visualization of right hip fixation. No acute fracture is identified. Vas
cular stent noted in the upper pelvis. Visualized soft tissues are otherwise unremarkable.



IMPRESSION:

No acute fracture identified in the setting of diffuse osteopenia. If the patient is acutely unable t
o bear weight, cross-sectional imaging is recommended to better evaluate.



Electronically signed by: Adalberto Vail MD (9/6/2021 5:32 PM) [facility]

## 2021-09-06 NOTE — PHYS DOC
Past Medical History


Past Medical History:  Hypertension


Past Surgical History:  No Surgical History


Additional Past Surgical Histo:  right elbow surgery, "BELLY SX"


Smoking Status:  Current Every Day Smoker


Alcohol Use:  None


Drug Use:  None





General Adult


EDM:


Chief Complaint:  MECHANICAL FALL





HPI:


HPI:





Patient is a 60  year old female who was brought here by EMS from home due to 

low back pain.  Patient says she tripped and fell on her low back 3 days ago, 

she had had some pain since.  Patient denies any head or neck injury.  Patient 

has been able to walk only with pain.  Patient denies any hip pain.  Patient 

denies any lower extremity pain.  Patient denies any bowel or bladder 

incontinence.





Review of Systems:


Review of Systems:


Constitutional:   Denies fever or chills. []


Eyes:   Denies change in visual acuity. []


HENT:   Denies nasal congestion or sore throat. [] 


Respiratory:   Denies cough or shortness of breath. [] 


Cardiovascular:   Denies chest pain or edema. [] 


GI:   Denies abdominal pain, nausea, vomiting, bloody stools or diarrhea. [] 


:  Denies dysuria. [] 


Musculoskeletal: Positive for low back pain.


Integument:   Denies rash. [] 


Neurologic:   Denies headache, focal weakness or sensory changes. [] 


Endocrine:   Denies polyuria or polydipsia. [] 


Lymphatic:  Denies swollen glands. [] 


Psychiatric:  Denies depression or anxiety. []





Heart Score:


C/O Chest Pain:  N/A


Risk Factors:


Risk Factors:  DM, Current or recent (<one month) smoker, HTN, HLP, family 

history of CAD, obesity.


Risk Scores:


Score 0 - 3:  2.5% MACE over next 6 weeks - Discharge Home


Score 4 - 6:  20.3% MACE over next 6 weeks - Admit for Clinical Observation


Score 7 - 10:  72.7% MACE over next 6 weeks - Early Invasive Strategies





Allergies:


Allergies:





Allergies








Coded Allergies Type Severity Reaction Last Updated Verified


 


  naproxen Allergy Intermediate  21 Yes











Physical Exam:


PE:





Constitutional: Well developed, well nourished, no acute distress, non-toxic 

appearance. []


HENT: Normocephalic, atraumatic, bilateral external ears normal, oropharynx 

moist, no oral exudates, nose normal. []


Eyes: PERRLA, EOMI, conjunctiva normal, no discharge. [] 


Neck: Normal range of motion, no tenderness, supple, no stridor. [] 


Cardiovascular:Heart rate regular rhythm, no murmur []


Lungs & Thorax:  Bilateral breath sounds clear to auscultation []


Abdomen: Bowel sounds normal, soft, no tenderness, no masses, no pulsatile 

masses. [] 


Skin: Warm, dry, no erythema, no rash. [] 


Back: There is tenderness to palpation at L4 and L5 area, no contusion noted, no

 bony step-off.  Pelvic is stable, nontender to palpation


Extremities: No tenderness, no cyanosis, no clubbing, ROM intact, no edema. [] 


Neurologic: Alert and oriented X 3, normal motor function, normal sensory 

function, no focal deficits noted. []


Psychologic: Affect normal, judgement normal, mood normal. []





Current Patient Data:


Vital Signs:





                                   Vital Signs








  Date Time  Temp Pulse Resp B/P (MAP) Pulse Ox O2 Delivery O2 Flow Rate FiO2


 


21 15:46 98.7 97 15 173/87 98 Room Air  





 98.7       











EKG:


EKG:


[]





Radiology/Procedures:


Radiology/Procedures:


[][facility]


                    [address] [place] [address]


                                 [phone]


                                        


                                 IMAGING REPORT





                                     Signed





PATIENT: MARISEL KNIGHT   ACCOUNT: [id number]     MRN#: [id number]


: 1960           LOCATION: ER              AGE: 60


SEX: F                    EXAM DT: 21         ACCESSION#: [id number]


STATUS: PRE ER            ORD. PHYSICIAN: ELI COSME DO


REASON: FELL, LOWER BACK PAIN, PELVIC PAIN


PROCEDURE: PELVIS





Exam: Pelvis 1 view





INDICATION: Fall, lower back pain





TECHNIQUE: Frontal view of the pelvis





Comparisons: None





FINDINGS:


Diffuse osteopenia. Partial visualization of right hip fixation. No acute 

fracture is identified. Vascular stent noted in the upper pelvis. Visualized 

soft tissues are otherwise unremarkable.





IMPRESSION:


No acute fracture identified in the setting of diffuse osteopenia. If the 

patient is acutely unable to bear weight, cross-sectional imaging is recommended

 to better evaluate.





Electronically signed by: Adalberto Alford MD (2021 5:32 PM) [facility]














DICTATED and SIGNED BY:     ADALBERTO ALFORD MD


DATE:     21 [id number]








                            [facility]


                    [address] [place] [address]


                                 [phone]


                                        


                                 IMAGING REPORT





                                     Signed





PATIENT: MARISEL KNIGHT   ACCOUNT: [id number]     MRN#: [id number]


: 1960           LOCATION: ER              AGE: 60


SEX: F                    EXAM DT: 21         ACCESSION#: [id number]


STATUS: PRE ER            ORD. PHYSICIAN: ELI COSME DO


REASON: FELL, LOWER BACK PAIN, PELVIC PAIN


PROCEDURE: LUMBAR SPINE 2-3V





Exam: Lumbar spine 3 views





INDICATION: Fall, lower back pain





TECHNIQUE: Frontal, lateral views of the lumbar spine with spot magnification 

view of the lumbosacral junction





Comparisons: None





FINDINGS:


Vertebral body heights and alignment are well-maintained.





Mild spondylotic changes lumbar spine with facet arthropathy predominantly in t

he lower lumbar spine.





Extensive vascular calcifications are noted. Soft tissues are otherwise 

unremarkable.





IMPRESSION:


Mild spondylotic changes in the lumbar spine as described above





Electronically signed by: Adalberto Alford MD (2021 5:34 PM) [facility]














DICTATED and SIGNED BY:     ADALBERTO ALFORD MD


DATE:     21MTH0 0





Course & Med Decision Making:


Course & Med Decision Making


Pertinent Labs and Imaging studies reviewed. (See chart for details)





Patient is a 60-year-old female who present to ER due to low back pain after she

 fell 3 days ago.  Examination did not show any contusion noted externally, x-

ray did not show any evidence of injury.  Patient was discharged home in stable 

condition.





Dragon Disclaimer:


Dragon Disclaimer:


This electronic medical record was generated, in whole or in part, using a voice

 recognition dictation system.





Departure


Departure


Impression:  


   Primary Impression:  


   Back pain


Disposition:   HOME / SELF CARE / HOMELESS


Condition:  IMPROVED


Referrals:  


RUBÉN NORWOOD (PCP)


follow up with your doctor this week.


Patient Instructions:  Back Pain, Adult





Additional Instructions:  


Thank you for visiting our Emergency Department. We appreciate you trusting us 

with your care. If any additional problems come up don't hesitate to return to 

visit us. Please follow up with your primary care provider so they can plan 

additional care if needed and know about the problem that you had. If symptoms 

worsen come back to the Emergency Department. Any concerning symptoms that start

 such as chest pain, shortness of air, weakness or numbness on one side of the 

body, running high fevers or any other concerning symptoms return to the ER.











ELI COSME DO                 Sep 6, 2021 16:22

## 2021-09-06 NOTE — RAD
Exam: Lumbar spine 3 views



INDICATION: Fall, lower back pain



TECHNIQUE: Frontal, lateral views of the lumbar spine with spot magnification view of the lumbosacral
 junction



Comparisons: None



FINDINGS:

Vertebral body heights and alignment are well-maintained.



Mild spondylotic changes lumbar spine with facet arthropathy predominantly in the lower lumbar spine.




Extensive vascular calcifications are noted. Soft tissues are otherwise unremarkable.



IMPRESSION:

Mild spondylotic changes in the lumbar spine as described above



Electronically signed by: Adalberto Vail MD (9/6/2021 5:34 PM) [facility]

## 2021-09-14 ENCOUNTER — HOSPITAL ENCOUNTER (OUTPATIENT)
Dept: HOSPITAL 63 - RAD | Age: 61
End: 2021-09-14
Attending: PHYSICIAN ASSISTANT
Payer: COMMERCIAL
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
## 2021-09-14 DIAGNOSIS — S32.2XXA: Primary | ICD-10-CM
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
## 2021-09-14 DIAGNOSIS — M53.3: ICD-10-CM

## 2021-09-14 PROCEDURE — 72220 X-RAY EXAM SACRUM TAILBONE: CPT

## 2021-09-14 PROCEDURE — 73502 X-RAY EXAM HIP UNI 2-3 VIEWS: CPT

## 2021-09-14 NOTE — RAD
EXAM: Pelvis and left hip, 2 views; sacrum and coccyx, 3 views.



HISTORY: Pain.



COMPARISON: 9/6/2021



FINDINGS: Frontal and frog-leg views of the left hip and 3 views of the sacrum and coccyx are obtaine
d. There is bone demineralization. This limits evaluation of fine bony detail. There is fixation inst
rumentation within the proximal right femur, partially included on the field-of-view. There is a righ
t iliac stent. The femoral heads are normal in configuration.



IMPRESSION: 

1. Bone demineralization. This limits avulsion of fine bony detail. There is no acute osseous finding
.

2. Partial visualization of fixation instrumentation within the proximal right femur.



Electronically signed by: Ekta Blackwell MD (9/14/2021 12:55 PM) [id number]

## 2021-09-14 NOTE — RAD
EXAM: Pelvis and left hip, 2 views; sacrum and coccyx, 3 views.



HISTORY: Pain.



COMPARISON: 9/6/2021



FINDINGS: Frontal and frog-leg views of the left hip and 3 views of the sacrum and coccyx are obtaine
d. There is bone demineralization. This limits evaluation of fine bony detail. There is fixation inst
rumentation within the proximal right femur, partially included on the field-of-view. There is a righ
t iliac stent. The femoral heads are normal in configuration.



IMPRESSION: 

1. Bone demineralization. This limits avulsion of fine bony detail. There is no acute osseous finding
.

2. Partial visualization of fixation instrumentation within the proximal right femur.



Electronically signed by: Ekta Blackwell MD (9/14/2021 12:55 PM) [id number]